# Patient Record
Sex: MALE | Race: OTHER | HISPANIC OR LATINO | URBAN - METROPOLITAN AREA
[De-identification: names, ages, dates, MRNs, and addresses within clinical notes are randomized per-mention and may not be internally consistent; named-entity substitution may affect disease eponyms.]

---

## 2022-12-26 ENCOUNTER — EMERGENCY (EMERGENCY)
Facility: HOSPITAL | Age: 57
LOS: 1 days | Discharge: HOME | End: 2022-12-26
Attending: EMERGENCY MEDICINE
Payer: COMMERCIAL

## 2022-12-26 VITALS
TEMPERATURE: 98 F | WEIGHT: 229.94 LBS | SYSTOLIC BLOOD PRESSURE: 137 MMHG | DIASTOLIC BLOOD PRESSURE: 85 MMHG | OXYGEN SATURATION: 99 % | HEART RATE: 69 BPM | HEIGHT: 69 IN | RESPIRATION RATE: 20 BRPM

## 2022-12-26 VITALS
RESPIRATION RATE: 18 BRPM | OXYGEN SATURATION: 100 % | HEART RATE: 77 BPM | TEMPERATURE: 98 F | SYSTOLIC BLOOD PRESSURE: 152 MMHG | DIASTOLIC BLOOD PRESSURE: 92 MMHG

## 2022-12-26 LAB
ALBUMIN SERPL ELPH-MCNC: 4.4 G/DL — SIGNIFICANT CHANGE UP (ref 3.3–5)
ALP SERPL-CCNC: 109 U/L — SIGNIFICANT CHANGE UP (ref 40–120)
ALT FLD-CCNC: 34 U/L — SIGNIFICANT CHANGE UP (ref 10–45)
ANION GAP SERPL CALC-SCNC: 10 MMOL/L — SIGNIFICANT CHANGE UP (ref 5–17)
AST SERPL-CCNC: 31 U/L — SIGNIFICANT CHANGE UP (ref 10–40)
BASE EXCESS BLDV CALC-SCNC: 3.2 MMOL/L — HIGH (ref -2–3)
BASOPHILS # BLD AUTO: 0.02 K/UL — SIGNIFICANT CHANGE UP (ref 0–0.2)
BASOPHILS NFR BLD AUTO: 0.4 % — SIGNIFICANT CHANGE UP (ref 0–2)
BILIRUB SERPL-MCNC: 0.4 MG/DL — SIGNIFICANT CHANGE UP (ref 0.2–1.2)
BLOOD GAS VENOUS - CREATININE: SIGNIFICANT CHANGE UP MG/DL (ref 0.5–1.3)
BUN SERPL-MCNC: 11 MG/DL — SIGNIFICANT CHANGE UP (ref 7–23)
CA-I SERPL-SCNC: 1.26 MMOL/L — SIGNIFICANT CHANGE UP (ref 1.15–1.33)
CALCIUM SERPL-MCNC: 9.3 MG/DL — SIGNIFICANT CHANGE UP (ref 8.4–10.5)
CHLORIDE BLDV-SCNC: 104 MMOL/L — SIGNIFICANT CHANGE UP (ref 96–108)
CHLORIDE SERPL-SCNC: 103 MMOL/L — SIGNIFICANT CHANGE UP (ref 96–108)
CO2 BLDV-SCNC: 32 MMOL/L — HIGH (ref 22–26)
CO2 SERPL-SCNC: 25 MMOL/L — SIGNIFICANT CHANGE UP (ref 22–31)
CREAT SERPL-MCNC: 0.88 MG/DL — SIGNIFICANT CHANGE UP (ref 0.5–1.3)
EGFR: 100 ML/MIN/1.73M2 — SIGNIFICANT CHANGE UP
EOSINOPHIL # BLD AUTO: 0.04 K/UL — SIGNIFICANT CHANGE UP (ref 0–0.5)
EOSINOPHIL NFR BLD AUTO: 0.8 % — SIGNIFICANT CHANGE UP (ref 0–6)
FLUAV AG NPH QL: SIGNIFICANT CHANGE UP
FLUBV AG NPH QL: SIGNIFICANT CHANGE UP
GAS PNL BLDV: 138 MMOL/L — SIGNIFICANT CHANGE UP (ref 136–145)
GAS PNL BLDV: SIGNIFICANT CHANGE UP
GAS PNL BLDV: SIGNIFICANT CHANGE UP
GLUCOSE BLDV-MCNC: 94 MG/DL — SIGNIFICANT CHANGE UP (ref 70–99)
GLUCOSE SERPL-MCNC: 93 MG/DL — SIGNIFICANT CHANGE UP (ref 70–99)
HCO3 BLDV-SCNC: 31 MMOL/L — HIGH (ref 22–29)
HCT VFR BLD CALC: 47.3 % — SIGNIFICANT CHANGE UP (ref 39–50)
HCT VFR BLDA CALC: 49 % — SIGNIFICANT CHANGE UP (ref 39–51)
HGB BLD CALC-MCNC: 16.3 G/DL — SIGNIFICANT CHANGE UP (ref 12.6–17.4)
HGB BLD-MCNC: 15.7 G/DL — SIGNIFICANT CHANGE UP (ref 13–17)
IMM GRANULOCYTES NFR BLD AUTO: 0.2 % — SIGNIFICANT CHANGE UP (ref 0–0.9)
LACTATE BLDV-MCNC: 1.6 MMOL/L — SIGNIFICANT CHANGE UP (ref 0.5–2)
LYMPHOCYTES # BLD AUTO: 1.48 K/UL — SIGNIFICANT CHANGE UP (ref 1–3.3)
LYMPHOCYTES # BLD AUTO: 30.7 % — SIGNIFICANT CHANGE UP (ref 13–44)
MAGNESIUM SERPL-MCNC: 2.1 MG/DL — SIGNIFICANT CHANGE UP (ref 1.6–2.6)
MCHC RBC-ENTMCNC: 30.2 PG — SIGNIFICANT CHANGE UP (ref 27–34)
MCHC RBC-ENTMCNC: 33.2 GM/DL — SIGNIFICANT CHANGE UP (ref 32–36)
MCV RBC AUTO: 91 FL — SIGNIFICANT CHANGE UP (ref 80–100)
MONOCYTES # BLD AUTO: 0.43 K/UL — SIGNIFICANT CHANGE UP (ref 0–0.9)
MONOCYTES NFR BLD AUTO: 8.9 % — SIGNIFICANT CHANGE UP (ref 2–14)
NEUTROPHILS # BLD AUTO: 2.84 K/UL — SIGNIFICANT CHANGE UP (ref 1.8–7.4)
NEUTROPHILS NFR BLD AUTO: 59 % — SIGNIFICANT CHANGE UP (ref 43–77)
NRBC # BLD: 0 /100 WBCS — SIGNIFICANT CHANGE UP (ref 0–0)
NT-PROBNP SERPL-SCNC: 26 PG/ML — SIGNIFICANT CHANGE UP (ref 0–300)
PCO2 BLDV: 57 MMHG — HIGH (ref 42–55)
PH BLDV: 7.34 — SIGNIFICANT CHANGE UP (ref 7.32–7.43)
PLATELET # BLD AUTO: 212 K/UL — SIGNIFICANT CHANGE UP (ref 150–400)
PO2 BLDV: 25 MMHG — SIGNIFICANT CHANGE UP (ref 25–45)
POTASSIUM BLDV-SCNC: 4.9 MMOL/L — SIGNIFICANT CHANGE UP (ref 3.5–5.1)
POTASSIUM SERPL-MCNC: 4.4 MMOL/L — SIGNIFICANT CHANGE UP (ref 3.5–5.3)
POTASSIUM SERPL-SCNC: 4.4 MMOL/L — SIGNIFICANT CHANGE UP (ref 3.5–5.3)
PROT SERPL-MCNC: 8 G/DL — SIGNIFICANT CHANGE UP (ref 6–8.3)
RBC # BLD: 5.2 M/UL — SIGNIFICANT CHANGE UP (ref 4.2–5.8)
RBC # FLD: 13.2 % — SIGNIFICANT CHANGE UP (ref 10.3–14.5)
RSV RNA NPH QL NAA+NON-PROBE: SIGNIFICANT CHANGE UP
SAO2 % BLDV: 36.8 % — LOW (ref 67–88)
SARS-COV-2 RNA SPEC QL NAA+PROBE: SIGNIFICANT CHANGE UP
SODIUM SERPL-SCNC: 138 MMOL/L — SIGNIFICANT CHANGE UP (ref 135–145)
TROPONIN T, HIGH SENSITIVITY RESULT: <6 NG/L — SIGNIFICANT CHANGE UP (ref 0–51)
WBC # BLD: 4.82 K/UL — SIGNIFICANT CHANGE UP (ref 3.8–10.5)
WBC # FLD AUTO: 4.82 K/UL — SIGNIFICANT CHANGE UP (ref 3.8–10.5)

## 2022-12-26 PROCEDURE — 82803 BLOOD GASES ANY COMBINATION: CPT

## 2022-12-26 PROCEDURE — 83605 ASSAY OF LACTIC ACID: CPT

## 2022-12-26 PROCEDURE — 85025 COMPLETE CBC W/AUTO DIFF WBC: CPT

## 2022-12-26 PROCEDURE — 82565 ASSAY OF CREATININE: CPT

## 2022-12-26 PROCEDURE — 85018 HEMOGLOBIN: CPT

## 2022-12-26 PROCEDURE — 36415 COLL VENOUS BLD VENIPUNCTURE: CPT

## 2022-12-26 PROCEDURE — 71046 X-RAY EXAM CHEST 2 VIEWS: CPT | Mod: 26

## 2022-12-26 PROCEDURE — 84484 ASSAY OF TROPONIN QUANT: CPT

## 2022-12-26 PROCEDURE — 84132 ASSAY OF SERUM POTASSIUM: CPT

## 2022-12-26 PROCEDURE — 83735 ASSAY OF MAGNESIUM: CPT

## 2022-12-26 PROCEDURE — 87637 SARSCOV2&INF A&B&RSV AMP PRB: CPT

## 2022-12-26 PROCEDURE — 70450 CT HEAD/BRAIN W/O DYE: CPT | Mod: 26,MA

## 2022-12-26 PROCEDURE — 82947 ASSAY GLUCOSE BLOOD QUANT: CPT

## 2022-12-26 PROCEDURE — 82435 ASSAY OF BLOOD CHLORIDE: CPT

## 2022-12-26 PROCEDURE — 80053 COMPREHEN METABOLIC PANEL: CPT

## 2022-12-26 PROCEDURE — 70450 CT HEAD/BRAIN W/O DYE: CPT | Mod: MA

## 2022-12-26 PROCEDURE — 82330 ASSAY OF CALCIUM: CPT

## 2022-12-26 PROCEDURE — 85014 HEMATOCRIT: CPT

## 2022-12-26 PROCEDURE — 84295 ASSAY OF SERUM SODIUM: CPT

## 2022-12-26 PROCEDURE — 71046 X-RAY EXAM CHEST 2 VIEWS: CPT

## 2022-12-26 PROCEDURE — 99285 EMERGENCY DEPT VISIT HI MDM: CPT | Mod: 25

## 2022-12-26 PROCEDURE — 99285 EMERGENCY DEPT VISIT HI MDM: CPT

## 2022-12-26 PROCEDURE — 83880 ASSAY OF NATRIURETIC PEPTIDE: CPT

## 2022-12-26 PROCEDURE — 93005 ELECTROCARDIOGRAM TRACING: CPT

## 2022-12-26 RX ORDER — VALACYCLOVIR 500 MG/1
1 TABLET, FILM COATED ORAL
Qty: 21 | Refills: 0
Start: 2022-12-26 | End: 2023-01-01

## 2022-12-26 RX ORDER — VALACYCLOVIR 500 MG/1
1000 TABLET, FILM COATED ORAL ONCE
Refills: 0 | Status: COMPLETED | OUTPATIENT
Start: 2022-12-26 | End: 2022-12-26

## 2022-12-26 RX ADMIN — VALACYCLOVIR 1000 MILLIGRAM(S): 500 TABLET, FILM COATED ORAL at 18:39

## 2022-12-26 RX ADMIN — Medication 50 MILLIGRAM(S): at 18:39

## 2022-12-26 NOTE — CONSULT NOTE ADULT - ASSESSMENT
57y M with Hx kidney stones and gout, presenting w/ facial palsy    Impression: R Bell's palsy w/ mild swelling (inferior of eye) and R mastoid pain, most likely of viral etiology    Recommendations:  [] start Valtrex for 1w  [] start prednisone 60mg for 5 days, followed by taper off by 10mg per day for 5 days  [] follow-up in 1-2w in neurology clinic. Can go to [address] [place] [address], [address], [place] (call [phone]). Alternatively, can report to neurology resident clinic at [facility] ([phone]).  [] neuro checks q4h while in ED  [] fu CTH  [] ok to discharge from neurology perspective if CTH unremarkable and otherwise medically stable  [] obtain MRI brain w/ w/o contrast as outpatient    Case discussed w/ attending Dr. Brennan 57y M with Hx kidney stones and gout, presenting w/ facial palsy    Impression: R Bell's palsy w/ mild swelling (inferior of eye) and R mastoid pain, most likely of viral etiology    Recommendations:  [] start Valtrex for 1w  [] start prednisone 60mg for 5 days, followed by taper off by 10mg per day for 5 days  [] follow-up in 1 week in neurology clinic, Dr. Michael Nissenbaum, [phone] for clinical follow-up and outpatient MRI brain wow gadolinium  [] neuro checks q4h while in ED  [] fu CTH  [] ok to discharge from neurology perspective if CTH unremarkable and otherwise medically stable.    Case discussed w/ attending Dr. Brennan

## 2022-12-26 NOTE — ED PROVIDER NOTE - NSFOLLOWUPCLINICS_GEN_ALL_ED_FT
[facility]  Ne[facility]  [address] [place] [address]  Phone: [phone]  Fax: [phone]

## 2022-12-26 NOTE — ED PROVIDER NOTE - NSFOLLOWUPINSTRUCTIONS_ED_ALL_ED_FT
Please follow-up with either the neurology clinic ([phone]) or the neurology resident clinic at ([phone]) within 1 week of discharge from the emergency department.    Please take valacyclovir and prednisone for 7 days. Take your medicine as directed. Contact your healthcare provider if you think your medicine is not helping or if you have side effects.     WHAT YOU NEED TO KNOW:  Bell palsy is a sudden weakness or paralysis of one side of your face. Bell palsy occurs when the nerve that controls the muscles in your face becomes swollen or irritated.    DISCHARGE INSTRUCTIONS:    Contact your healthcare provider if:  You have a fever.  Your eye becomes red, irritated, or painful.  You have questions or concerns about your condition or care.    Seek care immediately or call 911 if:  You develop weakness or numbness on one side of your body (other than your face).  You have double vision, or you lose vision in your eye.  You have trouble thinking clearly.    Follow up with your healthcare provider as directed: Write down your questions so you remember to ask them during your visits.    Eye care: Your healthcare provider may recommend that you use artificial tears during the day to keep your eye moist. You may need to use an eye ointment at night. You may also need to wear a patch over your eye and tape it shut while you sleep. This helps to keep your eye from getting dry and infected. Wear sunglasses to protect your eye from direct sunlight. Stay away from places that have fumes, dust, or other particles in the air that may harm your eye.

## 2022-12-26 NOTE — ED PROVIDER NOTE - PROGRESS NOTE DETAILS
Neuro aware, coming to evaluate patient. CT head is negative for any acute change.  Likely atypical bells palsy. Per neuro recommendations we will prescribe 1 week of valacyclovir and 1 week of oral prednisone with taper.  Instructed patient to follow-up with neurology office/neurology resident clinic.  Dispo to home.

## 2022-12-26 NOTE — CONSULT NOTE ADULT - SUBJECTIVE AND OBJECTIVE BOX
Neurology - Consult Note    -  Spectra: [phone] ([facility]), [phone] ([facility])  -    HPI: Patient RENETTA CORONEL is a 57y (1965) man with no past medical history, who presents for facial palsy. Started 3-4d ago. []      Review of Systems:  INCOMPLETE   CONSTITUTIONAL: No fevers or chills  EYES AND ENT: No visual changes or no throat pain   NECK: No pain or stiffness  RESPIRATORY: No hemoptysis or shortness of breath  CARDIOVASCULAR: No chest pain or palpitations  GASTROINTESTINAL: No melena or hematochezia  GENITOURINARY: No dysuria or hematuria  NEUROLOGICAL: +As stated in HPI above  SKIN: No itching, burning, rashes, or lesions   All other review of systems is negative unless indicated above.    Allergies:      PMHx/PSHx/Family Hx: As above, otherwise see below   Kidney stones        Social Hx:  No current use of tobacco, alcohol, or illicit drugs  Lives with ***    Medications:  MEDICATIONS  (STANDING):    MEDICATIONS  (PRN):      Vitals:  T(C): 36.8 (12-26-22 @ 12:19), Max: 36.8 (12-26-22 @ 12:19)  HR: 65 (12-26-22 @ 12:19) (65 - 69)  BP: 136/85 (12-26-22 @ 12:19) (136/85 - 137/85)  RR: 18 (12-26-22 @ 12:19) (18 - 20)  SpO2: 99% (12-26-22 @ 12:19) (99% - 99%)    Physical Examination: INCOMPLETE  General - NAD  Cardiovascular - Peripheral pulses palpable, no edema  Eyes - Fundoscopy with flat, sharp optic discs and no hemorrhage or exudates; Fundoscopy not well visualized; Fundoscopy not performed due to safety precautions in the setting of the COVID-19 pandemic    Neurologic Exam:  Mental status - Awake, Alert, Oriented to person, place, and time. Speech fluent, repetition and naming intact. Follows simple and complex commands. Attention/concentration, recent and remote memory (including registration and recall), and fund of knowledge intact    Cranial nerves - PERRLA, VFF, EOMI, face sensation (V1-V3) intact b/l, facial strength intact without asymmetry b/l, hearing intact b/l, palate with symmetric elevation, trapezius OR sternocleidomastiod 5/5 strength b/l, tongue midline on protrusion with full lateral movement    Motor - Normal bulk and tone throughout. No pronator drift.  Strength testing            Deltoid      Biceps      Triceps     Wrist Extension    Wrist Flexion     Interossei         R            5                 5               5                     5                              5                        5                 5  L             5                 5               5                     5                              5                        5                 5              Hip Flexion    Hip Extension    Knee Flexion    Knee Extension    Dorsiflexion    Plantar Flexion  R              5                           5                       5                           5                            5                          5  L              5                           5                        5                           5                            5                          5    Sensation - Light touch/temperature OR pain/vibration intact throughout    DTR's -             Biceps      Triceps     Brachioradialis      Patellar    Ankle    Toes/plantar response  R             2+             2+                  2+                       2+            2+                 Down  L              2+             2+                 2+                        2+           2+                 Down    Coordination - Finger to Nose intact b/l. No tremors appreciated    Gait and station - Normal casual gait. Romberg (-)                  Radiology:     Neurology - Consult Note    -  Spectra: [phone] ([facility]), [phone] ([facility])  -    HPI: Patient RENETTA CORONEL is a 57y (1965) man with Hx gout and kidney stones, who presents for facial palsy. Started 3-4d ago. First noticed a strange sensation w/ R eye. Started a few days ago. Having difficulty closing R eye and smiling on R side. Wife thinks his speech has been slurred. Also having swelling of cheekbone area below R eye. Endorsing pain overlying R mastoid; has an achy quality to it, rated as 3/10. Took baby aspirin this AM. Never had symptoms like this before. No sick contacts, recent illnesses, fevers, cough, or diarrhea. Additionally, reports transient episode of R eye blurriness 2 days ago that self-resolved.      Review of Systems:    CONSTITUTIONAL: No fevers or chills  EYES AND ENT: No visual changes or no throat pain   NECK: No pain or stiffness  RESPIRATORY: No hemoptysis or shortness of breath  CARDIOVASCULAR: No chest pain or palpitations  GASTROINTESTINAL: No melena or hematochezia  GENITOURINARY: No dysuria or hematuria  NEUROLOGICAL: +As stated in HPI above  SKIN: No itching, burning, rashes, or lesions   All other review of systems is negative unless indicated above.    Allergies:      PMHx/PSHx/Family Hx: As above, otherwise see below   Kidney stones        Social Hx:  Lives with wife and daughters    Medications:  MEDICATIONS  (STANDING):    MEDICATIONS  (PRN):      Vitals:  T(C): 36.8 (12-26-22 @ 12:19), Max: 36.8 (12-26-22 @ 12:19)  HR: 65 (12-26-22 @ 12:19) (65 - 69)  BP: 136/85 (12-26-22 @ 12:19) (136/85 - 137/85)  RR: 18 (12-26-22 @ 12:19) (18 - 20)  SpO2: 99% (12-26-22 @ 12:19) (99% - 99%)    Physical Examination:  General - NAD  Cardiovascular - Peripheral pulses palpable, no edema  Eyes - Fundoscopy not performed due to safety precautions in the setting of the COVID-19 pandemic    Neurologic Exam:  Mental status - Awake, Alert, Oriented to person, place, and time. Speech fluent, repetition and naming intact. Follows simple and complex commands. Attention/concentration, recent and remote memory (including registration and recall), and fund of knowledge intact    Cranial nerves - PERRLA, VFF, EOMI, face sensation (V1-V3) intact b/l, R Doll's palsy (decreased blinking and smile), hearing intact b/l, palate with symmetric elevation, trapezius 5/5 strength b/l, tongue midline on protrusion with full lateral movement    Motor - Normal bulk and tone throughout. No pronator drift.  Strength testing            Deltoid      Biceps      Triceps     Wrist Extension    Wrist Flexion     Interossei         R            5                 5               5                     5                              5                        5                 5  L             5                 5               5                     5                              5                        5                 5              Hip Flexion    Hip Extension    Knee Flexion    Knee Extension    Dorsiflexion    Plantar Flexion  R              5                           5                       5                           5                            5                          5  L              5                           5                        5                           5                            5                          5    Sensation - Light touch/temperature intact throughout    DTR's -             Biceps      Triceps     Brachioradialis      Patellar    Ankle    Toes/plantar response  R             2+             2+                  2+                       2+            2+                 Down  L              2+             2+                 2+                        2+           2+                 Down    Coordination - Finger to Nose intact b/l. No tremors appreciated    Gait and station - Normal casual gait.                  Radiology:  [facility] pending

## 2022-12-26 NOTE — ED ADULT NURSE NOTE - NSIMPLEMENTINTERV_GEN_ALL_ED
Implemented All Universal Safety Interventions:  [place] to call system. Call bell, personal items and telephone within reach. Instruct patient to call for assistance. Room bathroom lighting operational. Non-slip footwear when patient is off stretcher. Physically safe environment: no spills, clutter or unnecessary equipment. Stretcher in lowest position, wheels locked, appropriate side rails in place.

## 2022-12-26 NOTE — ED PROVIDER NOTE - OBJECTIVE STATEMENT
57-year-old male with a remote history of kidney stones presents with right-sided facial droop and chest pain.  Patient reports that a few days ago he began experiencing right-sided facial numbness, and 2 days ago he began experiencing right-sided neck pain.  Today, patient experienced chest pain that was not relieved by 2 aspirin.  The facial droop has also not resolved.  Patient reports that he has had no changes in his vision, no ear pain, no lesions anywhere on his skin, no peripheral weakness or difficulty with balance.  Patient has also not had any shortness of breath, nausea/vomiting, dysuria, peripheral edema, fever/chills. 57-year-old male with a remote history of kidney stones presents with right-sided facial droop and chest pain.  Patient reports that a few days ago he began experiencing right-sided facial numbness, and 2 days ago he began experiencing right-sided neck pain.  Today, patient experienced chest pain that was not relieved by 2 aspirin.  The facial droop has also not resolved.  Patient reports that he has had no changes in his vision, no ear pain, no lesions anywhere on his skin, no peripheral weakness or difficulty with balance.  Patient has also not had any shortness of breath, nausea/vomiting, dysuria, peripheral edema, fever/chills.      Attending note.  Patient was seen in hallway #4.  Agree with above.  Patient has 2 complaints.  Patient was complaining of pain behind his right ear 3 days ago.  He subsequently developed weakness on the right side of his face which was noted by his wife on  p.m.  The weakness has progressively worsened.  He also complains of some slight paresthesia on the right side of the face.  He has no previous episodes.  Denies any rash or lesions about the scalp or ear.  He has no other neurologic symptoms.  He has no change in his vision, gait or use of extremities.  He reports some dysarthria.  Patient is also complaining of episode of substernal sharp chest pain this morning which lasted approximately 10 to 15 minutes.  He had no other associated symptoms.  He had no radiation.  He has no previous episodes.  Patient is a retired  and had a negative stress test 14 years ago.  His father  in the early 60s of CAD.  He is a non-smoker.  He has no past medical history.  Takes no medication.

## 2022-12-26 NOTE — ED ADULT NURSE REASSESSMENT NOTE - NS ED NURSE REASSESS COMMENT FT1
Report received from Marty RESENDEZ. See paper chart for neuro flow sheet. CM in place. Awaiting CT scan at this time. Patient and family made aware.

## 2022-12-26 NOTE — ED PROVIDER NOTE - CLINICAL SUMMARY MEDICAL DECISION MAKING FREE TEXT BOX
57-year-old male without significant past medical history presents with right-sided facial droop and chest pain.  Right-sided facial droop with forehead sparing concerning for remote stroke vs atypical [place] palsy. Patient is not in stroke window, given that symptoms began more than 3 days ago, patient may also be out of window for treatment for Bell's palsy.  Chest pain started this morning, concern for acute cardiac cause.  Labs, troponin, chest x-ray, Noncon head CT, neuro consult ordered. 57-year-old male without significant past medical history presents with right-sided facial droop and chest pain.  Right-sided facial droop with forehead sparing concerning for remote stroke vs atypical [place] palsy. Patient is not in stroke window, given that symptoms began more than 3 days ago, patient may also be out of window for treatment for Bell's palsy.  Chest pain started this morning, concern for acute cardiac cause.  Labs, troponin, chest x-ray, Noncon head CT, neuro consult ordered.  Attn - 1) atypical Bell's palsy - more consistent with Bell's than CVA - CT head, Steroids and antivirals. Neuro consult.  2) Chest pain - EKG NL, Trop, cxr, labs, reassess

## 2022-12-26 NOTE — ED ADULT NURSE NOTE - OBJECTIVE STATEMENT
Pt came in for chest pain and right sided facial droop and numbness for 3 days. No distress. Breathing easy and non labored. Pt is ambulatory. Pt states he has a hx: kidney stones and gout. Pt is ambulatory. Pt is calm and cooperative. Pt's wife at bedside. Pt is ambulatory.

## 2022-12-26 NOTE — ED PROVIDER NOTE - NSPTACCESSSVCSAPPTDETAILS_ED_ALL_ED_FT
Please help patient arrange follow-up with the neurology/neurology resident clinic within 1 week of discharge for management of Bell's palsy

## 2022-12-26 NOTE — CONSULT NOTE ADULT - ATTENDING COMMENTS
I was not present during the pt's evaluation by the Neurology Resident/Fellow.  Given the clinical presentation and outcome of the tests done, I agree with the above assessment and recommendations.

## 2022-12-26 NOTE — ED PROVIDER NOTE - PHYSICAL EXAMINATION
GENERAL: well appearing, in no acute distress, non-toxic appearing  HEAD: normocephalic  HEENT: normal conjunctiva, oral mucosa moist, uvula midline, no tonsilar exudates, neck supple  CARDIAC: regular rate and rhythm, normal S1S2, no appreciable murmurs, 2+ pulses in UE/LE b/l  PULM: normal breath sounds, clear to ascultation bilaterally, no rales, rhonchi, wheezing  GI: abdomen nondistended, soft, nontender  : no suprapubic tenderness  NEURO: right sided facial droop sparing the right forehead, flattened right nasolabial fold, difficulty closing right eye, no changes in vision, CN8-12 in tact, no peripheral motor or sensory deficits, patellar reflex 2+, normal speech, PERRLA, EOMI, AAOx3  MSK: no peripheral edema, no calf tenderness b/l  SKIN: well-perfused, extremities warm  PSYCH: appropriate mood and affect GENERAL: well appearing, in no acute distress, non-toxic appearing  HEAD: normocephalic  HEENT: normal conjunctiva, oral mucosa moist, uvula midline, no tonsilar exudates, neck supple  CARDIAC: regular rate and rhythm, normal S1S2, no appreciable murmurs, 2+ pulses in UE/LE b/l  PULM: normal breath sounds, clear to ascultation bilaterally, no rales, rhonchi, wheezing  GI: abdomen nondistended, soft, nontender  : no suprapubic tenderness  NEURO: right sided facial droop sparing the right forehead, flattened right nasolabial fold, difficulty closing right eye, no changes in vision, CN8-12 in tact, no peripheral motor or sensory deficits, patellar reflex 2+, normal speech, PERRLA, EOMI, AAOx3  MSK: no peripheral edema, no calf tenderness b/l  SKIN: well-perfused, extremities warm  PSYCH: appropriate mood and affect      Attn - alert, NAD, no pallor or jaundice, PERRL 3 mm, moist mm, skin - warm and dry, Lungs - clear, no w/r/r, good BS bilaterally, Cor - rr, no M, no rub, Abdo - ND, soft, NT, no HSM, no CVAT, no guarding or rebound. Extremities - no edema, no calf tenderness, distal pulses intact and symmetrical, Neuro - R facial droop with slight weakness of R forehead. sensation intact, motor 5/5, no drift in upper or lower extrem.  speech clear and fluent.  no rash or lesions to ear or scalp.

## 2022-12-26 NOTE — ED PROVIDER NOTE - PATIENT PORTAL LINK FT
You can access the FollowMyHealth Patient Portal offered by [facility] by registering at the following website: [email or web address]. By joining [facility]’s FollowMyHealth portal, you will also be able to view your health information using other applications (apps) compatible with our system.

## 2022-12-29 ENCOUNTER — APPOINTMENT (OUTPATIENT)
Dept: NEUROLOGY | Facility: CLINIC | Age: 57
End: 2022-12-29
Payer: COMMERCIAL

## 2022-12-29 VITALS
SYSTOLIC BLOOD PRESSURE: 140 MMHG | DIASTOLIC BLOOD PRESSURE: 80 MMHG | HEIGHT: 68 IN | WEIGHT: 235 LBS | HEART RATE: 64 BPM | BODY MASS INDEX: 35.61 KG/M2

## 2022-12-29 DIAGNOSIS — G51.0 BELL'S PALSY: ICD-10-CM

## 2022-12-29 PROBLEM — Z00.00 ENCOUNTER FOR PREVENTIVE HEALTH EXAMINATION: Status: ACTIVE | Noted: 2022-12-29

## 2022-12-29 PROCEDURE — 99202 OFFICE O/P NEW SF 15 MIN: CPT

## 2022-12-29 NOTE — HISTORY OF PRESENT ILLNESS
[FreeTextEntry1] : 57-year-old man noted onset of right facial weakness and right mastoid discomfort 6 days ago and went to [facility] emergency room 3 days ago and seen by the neurology team supervised by Dr. Iverson.  He was diagnosed to have right-sided Bell's palsy and started treatment with valacyclovir and a tapering course of prednisone.\par \par Denies any previous medical problems.\par \par Retired .

## 2022-12-29 NOTE — ASSESSMENT
[FreeTextEntry1] : Continue current therapy.  Keep right eye patched at night.  Advised to contact me if he has significant weakness in 3 months.

## 2022-12-29 NOTE — PHYSICAL EXAM
[FreeTextEntry1] : He has significant weakness of the right orbicularis oculi and oris.  Has hyperacusis in the right ear.  Remainder of a detailed neurologic examination is normal.

## 2022-12-30 PROBLEM — N20.0 CALCULUS OF KIDNEY: Chronic | Status: ACTIVE | Noted: 2022-12-26

## 2023-01-03 ENCOUNTER — TRANSCRIPTION ENCOUNTER (OUTPATIENT)
Age: 58
End: 2023-01-03